# Patient Record
(demographics unavailable — no encounter records)

---

## 2025-01-15 NOTE — HISTORY OF PRESENT ILLNESS
[FreeTextEntry1] : 68 year old Female with no known past medical history is scheduled for osteoporosis follow-up  Osteoporosis history: DEXA scan in 9/8/2023: AP spine(L1-L4) is equal to -2.8 consistent with osteoporosis. She had DEXA scan at the age of 50 when she was told to have osteopenia. She had a traumatic metatarsal fracture 10 years ago. Her age at menopause: 55. She denies history of eating disorders, and malnutrition, use of the steroids and anticonvulsant drugs or diuretic use. Did not have a recent fall. She has lost 1 to 2 inches. Mother had osteoporosis.  She was on risedronate 150 mg once a month in Jan 2024 but had severe cramps so she switched to alendronate 10 mg daily. She takes in the morning in an empty stomach with lots of water. Initially she had heartburn symptoms and cough but its now resolved. Reports she is tolerating alendronate 10 mg daily well.  Most recent DXA as above Vitamin D intake: She takes calcium and vitamin D supplement 600 mg-800 IU once daily. Exercise: She does go to gym and does resistance training exercises including weightlifting once a week. Diet: She eats vegan diet Dental care: She had a dental procedure done many years ago, no plan to do future dental procedures, however recently had a tooth crown replaced Physical activity: Yes, very active

## 2025-01-15 NOTE — PHYSICAL EXAM
[Alert] : alert [Well Nourished] : well nourished [No Acute Distress] : no acute distress [Well Developed] : well developed [Normal Sclera/Conjunctiva] : normal sclera/conjunctiva [EOMI] : extra ocular movement intact [No Proptosis] : no proptosis [Normal Oropharynx] : the oropharynx was normal [Thyroid Not Enlarged] : the thyroid was not enlarged [No Thyroid Nodules] : no palpable thyroid nodules [No Respiratory Distress] : no respiratory distress [No Accessory Muscle Use] : no accessory muscle use [Clear to Auscultation] : lungs were clear to auscultation bilaterally [No Edema] : no peripheral edema [Pedal Pulses Normal] : the pedal pulses are present [Normal Bowel Sounds] : normal bowel sounds [Not Tender] : non-tender [Not Distended] : not distended [Soft] : abdomen soft [Normal Anterior Cervical Nodes] : no anterior cervical lymphadenopathy [No Spinal Tenderness] : no spinal tenderness [Spine Straight] : spine straight [No Stigmata of Cushings Syndrome] : no stigmata of Cushings Syndrome [Normal Gait] : normal gait [No Clubbing, Cyanosis] : no clubbing  or cyanosis of the fingernails [Normal Strength/Tone] : muscle strength and tone were normal [No Rash] : no rash [No Motor Deficits] : the motor exam was normal [No Sensory Deficits] : the sensory exam was normal to light touch and pinprick [Normal Reflexes] : deep tendon reflexes were 2+ and symmetric [No Tremors] : no tremors [Oriented x3] : oriented to person, place, and time [Acanthosis Nigricans] : no acanthosis nigricans

## 2025-01-15 NOTE — REVIEW OF SYSTEMS
[Fatigue] : fatigue [Recent Weight Gain (___ Lbs)] : recent weight gain: [unfilled] lbs [Decreased Appetite] : appetite not decreased [Recent Weight Loss (___ Lbs)] : no recent weight loss [Blurred Vision] : no blurred vision [Neck Pain] : no neck pain [Chest Pain] : no chest pain [Palpitations] : no palpitations [Lower Ext Edema] : no lower extremity edema [Shortness Of Breath] : no shortness of breath [SOB on Exertion] : no shortness of breath on exertion [Nausea] : no nausea [Constipation] : no constipation [Abdominal Pain] : no abdominal pain [Vomiting] : no vomiting [Diarrhea] : no diarrhea [Polyuria] : no polyuria [Joint Pain] : no joint pain [Muscle Weakness] : no muscle weakness [Headaches] : no headaches [Dizziness] : no dizziness [Tremors] : no tremors [Pain/Numbness of Digits] : no pain/numbness of digits [Polydipsia] : no polydipsia [Cold Intolerance] : no cold intolerance

## 2025-01-15 NOTE — ASSESSMENT
[FreeTextEntry1] : Patient has osteoporosis of spine T score is -2.8. She could not tolerate Risedronate but is tolerating alendronate 10 mg daily and takes with appropriate administration technique. The most recent calcium is wnl The vitamin D levels are 30.1, continuing vitamin D 1200 IU (800 in combination with calcium and separate 400 IU vitamin D) Patient is aware of the risks associated with bisphosphonate therapy including risk for osteonecrosis of the jaw and atypical femoral fractures. I informed her that these risks are low, but increase with prolonged use of bisphosphonate therapy. It can cause gastritis/ esophagitis and counseled her to take the medication on an empty stomach with a full glass of water, and to avoid reclining or eating for 30 minutes after taking the medication. Will do a February 2026 to evaluate if she needs to be changed from oral to IV bisphosphonates. Patient verbalized understanding and agrees with the plan.  RTC in Jan 2025.

## 2025-07-16 NOTE — ASSESSMENT
[FreeTextEntry1] : Patient has osteoporosis of spine T score is -2.8. She could not tolerate Risedronate but is tolerating alendronate 10 mg daily and takes with appropriate administration technique. The most recent calcium is wnl The vitamin D levels is trending down, discussed to start vitamin D 1000 IU daily with calcium supplements Patient is aware of the risks associated with bisphosphonate therapy including risk for osteonecrosis of the jaw and atypical femoral fractures. I informed her that these risks are low, but increase with prolonged use of bisphosphonate therapy. It can cause gastritis/ esophagitis and counseled her to take the medication on an empty stomach with a full glass of water, and to avoid reclining or eating for 30 minutes after taking the medication. DEXA scan Sept 2025 Labs significant for low GFR and elevated BUN Discussed to repeat the CMP, if still abnormal , will hold the alendronate Patient verbalized understanding and agrees with the plan.  RTC in Oct  2025.

## 2025-07-16 NOTE — HISTORY OF PRESENT ILLNESS
[FreeTextEntry1] : 68 year old Female with PMH of osteoporosis and vitamin D deficiency is scheduled for osteoporosis follow-up  Osteoporosis history: DEXA scan in 9/8/2023: AP spine(L1-L4) is equal to -2.8 consistent with osteoporosis. She had DEXA scan at the age of 50 when she was told to have osteopenia. She had a traumatic metatarsal fracture 10 years ago. Her age at menopause: 55. She denies history of eating disorders, and malnutrition, use of the steroids and anticonvulsant drugs or diuretic use. Did not have a recent fall. She has lost 1 to 2 inches. Mother had osteoporosis.  She was on risedronate 150 mg once a month in Jan 2024 but had severe cramps, so she switched to alendronate 10 mg daily. She takes in the morning in an empty stomach with lots of water. Initially she had heartburn symptoms and cough but its now resolved. Reports she is tolerating alendronate 10 mg daily well. However, today she states that she now feels intermittently acid reflux. Does not take any medication.  Most recent DXA as above Vitamin D intake: She takes  calcium 600 IU daily, vitamin D 800 IU.( 400 mg 2 tabs) Exercise: She does go to gym and does resistance training exercises including weightlifting once a week. Diet: She eats vegan diet Dental care: She had a dental procedure done many years ago, no plan to do future dental procedures, however recently had a tooth crown replaced Physical activity: Yes, very active